# Patient Record
Sex: MALE | Race: WHITE
[De-identification: names, ages, dates, MRNs, and addresses within clinical notes are randomized per-mention and may not be internally consistent; named-entity substitution may affect disease eponyms.]

---

## 2018-08-05 ENCOUNTER — HOSPITAL ENCOUNTER (INPATIENT)
Dept: HOSPITAL 54 - ER | Age: 53
LOS: 1 days | Discharge: HOME | DRG: 243 | End: 2018-08-06
Attending: FAMILY MEDICINE | Admitting: NURSE PRACTITIONER
Payer: MEDICAID

## 2018-08-05 VITALS — SYSTOLIC BLOOD PRESSURE: 129 MMHG | DIASTOLIC BLOOD PRESSURE: 67 MMHG

## 2018-08-05 VITALS — DIASTOLIC BLOOD PRESSURE: 74 MMHG | SYSTOLIC BLOOD PRESSURE: 128 MMHG

## 2018-08-05 VITALS — SYSTOLIC BLOOD PRESSURE: 137 MMHG | DIASTOLIC BLOOD PRESSURE: 68 MMHG

## 2018-08-05 VITALS — DIASTOLIC BLOOD PRESSURE: 74 MMHG | SYSTOLIC BLOOD PRESSURE: 123 MMHG

## 2018-08-05 VITALS — BODY MASS INDEX: 32.28 KG/M2 | HEIGHT: 68 IN | WEIGHT: 213 LBS

## 2018-08-05 VITALS — SYSTOLIC BLOOD PRESSURE: 123 MMHG | DIASTOLIC BLOOD PRESSURE: 69 MMHG

## 2018-08-05 VITALS — DIASTOLIC BLOOD PRESSURE: 68 MMHG | SYSTOLIC BLOOD PRESSURE: 137 MMHG

## 2018-08-05 VITALS — SYSTOLIC BLOOD PRESSURE: 126 MMHG | DIASTOLIC BLOOD PRESSURE: 72 MMHG

## 2018-08-05 DIAGNOSIS — I10: ICD-10-CM

## 2018-08-05 DIAGNOSIS — E66.9: ICD-10-CM

## 2018-08-05 DIAGNOSIS — Z95.5: ICD-10-CM

## 2018-08-05 DIAGNOSIS — Z79.4: ICD-10-CM

## 2018-08-05 DIAGNOSIS — K21.9: Primary | ICD-10-CM

## 2018-08-05 DIAGNOSIS — E11.9: ICD-10-CM

## 2018-08-05 DIAGNOSIS — E83.42: ICD-10-CM

## 2018-08-05 DIAGNOSIS — Z83.3: ICD-10-CM

## 2018-08-05 DIAGNOSIS — I25.10: ICD-10-CM

## 2018-08-05 DIAGNOSIS — N17.0: ICD-10-CM

## 2018-08-05 DIAGNOSIS — Z79.899: ICD-10-CM

## 2018-08-05 DIAGNOSIS — Z79.82: ICD-10-CM

## 2018-08-05 LAB
APTT PPP: 27 SEC (ref 23–34)
BASOPHILS # BLD AUTO: 0 /CMM (ref 0–0.2)
BASOPHILS # BLD AUTO: 0 /CMM (ref 0–0.2)
BASOPHILS NFR BLD AUTO: 0.3 % (ref 0–2)
BASOPHILS NFR BLD AUTO: 0.4 % (ref 0–2)
BUN SERPL-MCNC: 17 MG/DL (ref 7–18)
BUN SERPL-MCNC: 17 MG/DL (ref 7–18)
CALCIUM SERPL-MCNC: 8.6 MG/DL (ref 8.5–10.1)
CALCIUM SERPL-MCNC: 8.8 MG/DL (ref 8.5–10.1)
CHLORIDE SERPL-SCNC: 101 MMOL/L (ref 98–107)
CHLORIDE SERPL-SCNC: 102 MMOL/L (ref 98–107)
CHOLEST SERPL-MCNC: 201 MG/DL (ref ?–200)
CO2 SERPL-SCNC: 28 MMOL/L (ref 21–32)
CO2 SERPL-SCNC: 28 MMOL/L (ref 21–32)
CREAT SERPL-MCNC: 1.1 MG/DL (ref 0.6–1.3)
CREAT SERPL-MCNC: 1.5 MG/DL (ref 0.6–1.3)
EOSINOPHIL NFR BLD AUTO: 0 % (ref 0–6)
EOSINOPHIL NFR BLD AUTO: 0.7 % (ref 0–6)
GLUCOSE SERPL-MCNC: 183 MG/DL (ref 74–106)
GLUCOSE SERPL-MCNC: 230 MG/DL (ref 74–106)
HCT VFR BLD AUTO: 39 % (ref 39–51)
HCT VFR BLD AUTO: 42 % (ref 39–51)
HDLC SERPL-MCNC: 50 MG/DL (ref 40–60)
HGB BLD-MCNC: 13.6 G/DL (ref 13.5–17.5)
HGB BLD-MCNC: 14.3 G/DL (ref 13.5–17.5)
INR PPP: 1 (ref 0.87–1.13)
LDLC SERPL DIRECT ASSAY-MCNC: 129 MG/DL (ref 0–99)
LYMPHOCYTES NFR BLD AUTO: 1.6 /CMM (ref 0.8–4.8)
LYMPHOCYTES NFR BLD AUTO: 1.6 /CMM (ref 0.8–4.8)
LYMPHOCYTES NFR BLD AUTO: 24 % (ref 20–44)
LYMPHOCYTES NFR BLD AUTO: 25.6 % (ref 20–44)
MAGNESIUM SERPL-MCNC: 1.7 MG/DL (ref 1.8–2.4)
MCH RBC QN AUTO: 31 PG (ref 26–33)
MCH RBC QN AUTO: 31 PG (ref 26–33)
MCHC RBC AUTO-ENTMCNC: 34 G/DL (ref 31–36)
MCHC RBC AUTO-ENTMCNC: 35 G/DL (ref 31–36)
MCV RBC AUTO: 89 FL (ref 80–96)
MCV RBC AUTO: 90 FL (ref 80–96)
MONOCYTES NFR BLD AUTO: 0.8 /CMM (ref 0.1–1.3)
MONOCYTES NFR BLD AUTO: 0.8 /CMM (ref 0.1–1.3)
MONOCYTES NFR BLD AUTO: 12.4 % (ref 2–12)
MONOCYTES NFR BLD AUTO: 13.1 % (ref 2–12)
NEUTROPHILS # BLD AUTO: 3.9 /CMM (ref 1.8–8.9)
NEUTROPHILS # BLD AUTO: 4.1 /CMM (ref 1.8–8.9)
NEUTROPHILS NFR BLD AUTO: 60.9 % (ref 43–81)
NEUTROPHILS NFR BLD AUTO: 62.6 % (ref 43–81)
NT-PROBNP SERPL-MCNC: 18 PG/ML (ref 0–125)
PHOSPHATE SERPL-MCNC: 3.2 MG/DL (ref 2.5–4.9)
PLATELET # BLD AUTO: 217 /CMM (ref 150–450)
PLATELET # BLD AUTO: 232 /CMM (ref 150–450)
POTASSIUM SERPL-SCNC: 4.3 MMOL/L (ref 3.5–5.1)
POTASSIUM SERPL-SCNC: 4.4 MMOL/L (ref 3.5–5.1)
RBC # BLD AUTO: 4.39 MIL/UL (ref 4.5–6)
RBC # BLD AUTO: 4.66 MIL/UL (ref 4.5–6)
RDW COEFFICIENT OF VARIATION: 12.7 (ref 11.5–15)
RDW COEFFICIENT OF VARIATION: 12.9 (ref 11.5–15)
SODIUM SERPL-SCNC: 138 MMOL/L (ref 136–145)
SODIUM SERPL-SCNC: 140 MMOL/L (ref 136–145)
TRIGL SERPL-MCNC: 103 MG/DL (ref 30–150)
TROPONIN I SERPL-MCNC: < 0.017 NG/ML (ref 0–0.06)
TSH SERPL DL<=0.005 MIU/L-ACNC: 1.88 UIU/ML (ref 0.36–3.74)
WBC NRBC COR # BLD AUTO: 6.4 K/UL (ref 4.3–11)
WBC NRBC COR # BLD AUTO: 6.6 K/UL (ref 4.3–11)

## 2018-08-05 PROCEDURE — Z7610: HCPCS

## 2018-08-05 PROCEDURE — A4606 OXYGEN PROBE USED W OXIMETER: HCPCS

## 2018-08-05 RX ADMIN — INSULIN HUMAN PRN UNIT: 100 INJECTION, SOLUTION PARENTERAL at 07:52

## 2018-08-05 RX ADMIN — Medication SCH EACH: at 11:48

## 2018-08-05 RX ADMIN — Medication SCH MG: at 09:20

## 2018-08-05 RX ADMIN — CLOPIDOGREL BISULFATE SCH MG: 75 TABLET, FILM COATED ORAL at 09:20

## 2018-08-05 RX ADMIN — Medication SCH EACH: at 07:49

## 2018-08-05 RX ADMIN — Medication SCH EACH: at 18:27

## 2018-08-05 RX ADMIN — Medication SCH EACH: at 21:23

## 2018-08-05 RX ADMIN — INSULIN HUMAN PRN UNIT: 100 INJECTION, SOLUTION PARENTERAL at 12:53

## 2018-08-05 RX ADMIN — ATORVASTATIN CALCIUM SCH MG: 40 TABLET, FILM COATED ORAL at 09:50

## 2018-08-05 RX ADMIN — METOPROLOL TARTRATE SCH MG: 25 TABLET, FILM COATED ORAL at 11:48

## 2018-08-05 RX ADMIN — Medication SCH EACH: at 17:31

## 2018-08-05 RX ADMIN — SODIUM CHLORIDE PRN MLS/HR: 9 INJECTION, SOLUTION INTRAVENOUS at 05:06

## 2018-08-05 RX ADMIN — MAGNESIUM SULFATE IN DEXTROSE SCH MLS/HR: 10 INJECTION, SOLUTION INTRAVENOUS at 11:11

## 2018-08-05 RX ADMIN — METOPROLOL TARTRATE SCH MG: 25 TABLET, FILM COATED ORAL at 17:31

## 2018-08-05 RX ADMIN — MAGNESIUM SULFATE IN DEXTROSE SCH MLS/HR: 10 INJECTION, SOLUTION INTRAVENOUS at 09:50

## 2018-08-05 RX ADMIN — INSULIN HUMAN PRN UNIT: 100 INJECTION, SOLUTION PARENTERAL at 18:26

## 2018-08-05 NOTE — NUR
TELE RN NOTES



RECEIVED PT FROM ER NURSE CORY. PT ON ROOM AIR, SATURATING WELL, NO RESPIRATORY DISTRESS 
NOTED. VITAL SIGNS STABLE. ON TELE MONITOR SR 78. SKIN ASSESSMENT DONE. AWAITING ADMITTING 
ORDERS. WILL CONTINUE TO MONITOR PT CLOSELY.

## 2018-08-05 NOTE — NUR
PT AMBULATORY TO ER BED 11. BIBFAMILY FROM HOME C/O CP X 3 DAYS;BURNING PAIN 
7/10. PT PLACED IN GOWN AND ON CARDIAC MONITOR. VSS/RESP EVEN UNLABORED/NAD 
NOTED/SKIN WARM AND DRY/AFEBRILE/DENIES N-V-D/AOX4. AWAITNG MD RINALDI.

## 2018-08-05 NOTE — NUR
TELE RN NOTES



NO ACUTE CHANGES NOTED DURING THE SHIFT. PROVIDED COMFORT AND SAFETY. BED ALARM ON. HEAD OF 
BED ELEVATED. SIDE RAILS UP. CALL LIGHT WITHIN REACH. ENDORSED TO THE AM NURSE FOR 
CONTINUITY FOR CARE.

## 2018-08-05 NOTE — NUR
NO C/O CHEST PAIN THROUGHOUT SHIFT. BLOOD SUGARS MONITORED, SPOKE WITH DR MELLO RE - 
SLIDING SCALE ORDER AND STATED TO MAKE IT TO MODERATE SCALE. PT EATS WELL, VOIDS WELL AND 
AMBULATES STEADILY. FAMILY AT BEDSIDE. BED LOW AND LOCKED. CALL LIGHT WITHN REACHED. WILL 
CONT TO MONITOR.

## 2018-08-05 NOTE — NUR
20G IV TO R HAND X 1 ATTEMPT USING ASEPTIC TECH. IV FLUSHES EASILY WITH NS, NO 
S/S INFILTRATION NOTED AT THIS TIME.

## 2018-08-05 NOTE — NUR
-------------------------------------------------------------------------------

            *** Note juan luis in ED - 08/05/18 at 0239 by SOBEIDA ***             

-------------------------------------------------------------------------------

20G IV TO L AC X 1 ATTEMPT USING ASEPTIC TECH. IV FLUSHES EASILY WITH NS, NO 
S/S INFILTRATION NOTED AT THIS TIME.

## 2018-08-06 VITALS — DIASTOLIC BLOOD PRESSURE: 56 MMHG | SYSTOLIC BLOOD PRESSURE: 109 MMHG

## 2018-08-06 VITALS — DIASTOLIC BLOOD PRESSURE: 70 MMHG | SYSTOLIC BLOOD PRESSURE: 127 MMHG

## 2018-08-06 VITALS — DIASTOLIC BLOOD PRESSURE: 77 MMHG | SYSTOLIC BLOOD PRESSURE: 123 MMHG

## 2018-08-06 VITALS — SYSTOLIC BLOOD PRESSURE: 123 MMHG | DIASTOLIC BLOOD PRESSURE: 77 MMHG

## 2018-08-06 VITALS — DIASTOLIC BLOOD PRESSURE: 65 MMHG | SYSTOLIC BLOOD PRESSURE: 149 MMHG

## 2018-08-06 LAB
ALBUMIN SERPL BCP-MCNC: 3.5 G/DL (ref 3.4–5)
ALP SERPL-CCNC: 64 U/L (ref 46–116)
ALT SERPL W P-5'-P-CCNC: 36 U/L (ref 12–78)
AST SERPL W P-5'-P-CCNC: 17 U/L (ref 15–37)
BASOPHILS # BLD AUTO: 0 /CMM (ref 0–0.2)
BASOPHILS NFR BLD AUTO: 0.2 % (ref 0–2)
BILIRUB SERPL-MCNC: 0.6 MG/DL (ref 0.2–1)
BUN SERPL-MCNC: 13 MG/DL (ref 7–18)
CALCIUM SERPL-MCNC: 8.2 MG/DL (ref 8.5–10.1)
CHLORIDE SERPL-SCNC: 103 MMOL/L (ref 98–107)
CO2 SERPL-SCNC: 28 MMOL/L (ref 21–32)
CREAT SERPL-MCNC: 1 MG/DL (ref 0.6–1.3)
EOSINOPHIL NFR BLD AUTO: 1.1 % (ref 0–6)
GLUCOSE SERPL-MCNC: 190 MG/DL (ref 74–106)
HCT VFR BLD AUTO: 41 % (ref 39–51)
HGB BLD-MCNC: 13.9 G/DL (ref 13.5–17.5)
LYMPHOCYTES NFR BLD AUTO: 1.4 /CMM (ref 0.8–4.8)
LYMPHOCYTES NFR BLD AUTO: 23.2 % (ref 20–44)
MAGNESIUM SERPL-MCNC: 2 MG/DL (ref 1.8–2.4)
MCH RBC QN AUTO: 31 PG (ref 26–33)
MCHC RBC AUTO-ENTMCNC: 34 G/DL (ref 31–36)
MCV RBC AUTO: 90 FL (ref 80–96)
MONOCYTES NFR BLD AUTO: 0.6 /CMM (ref 0.1–1.3)
MONOCYTES NFR BLD AUTO: 10.5 % (ref 2–12)
NEUTROPHILS # BLD AUTO: 4 /CMM (ref 1.8–8.9)
NEUTROPHILS NFR BLD AUTO: 65 % (ref 43–81)
PHOSPHATE SERPL-MCNC: 2.9 MG/DL (ref 2.5–4.9)
PLATELET # BLD AUTO: 202 /CMM (ref 150–450)
POTASSIUM SERPL-SCNC: 4.5 MMOL/L (ref 3.5–5.1)
PROT SERPL-MCNC: 6.7 G/DL (ref 6.4–8.2)
RBC # BLD AUTO: 4.49 MIL/UL (ref 4.5–6)
RDW COEFFICIENT OF VARIATION: 12.4 (ref 11.5–15)
SODIUM SERPL-SCNC: 139 MMOL/L (ref 136–145)
TROPONIN I SERPL-MCNC: < 0.017 NG/ML (ref 0–0.06)
WBC NRBC COR # BLD AUTO: 6.1 K/UL (ref 4.3–11)

## 2018-08-06 RX ADMIN — SODIUM CHLORIDE PRN MLS/HR: 9 INJECTION, SOLUTION INTRAVENOUS at 00:52

## 2018-08-06 RX ADMIN — METOPROLOL TARTRATE SCH MG: 25 TABLET, FILM COATED ORAL at 12:30

## 2018-08-06 RX ADMIN — Medication SCH EACH: at 07:39

## 2018-08-06 RX ADMIN — METOPROLOL TARTRATE SCH MG: 25 TABLET, FILM COATED ORAL at 00:00

## 2018-08-06 RX ADMIN — ATORVASTATIN CALCIUM SCH MG: 40 TABLET, FILM COATED ORAL at 09:15

## 2018-08-06 RX ADMIN — Medication SCH EACH: at 12:30

## 2018-08-06 RX ADMIN — METOPROLOL TARTRATE SCH MG: 25 TABLET, FILM COATED ORAL at 05:20

## 2018-08-06 RX ADMIN — INSULIN HUMAN PRN UNIT: 100 INJECTION, SOLUTION PARENTERAL at 12:29

## 2018-08-06 RX ADMIN — Medication SCH MG: at 09:15

## 2018-08-06 RX ADMIN — INSULIN HUMAN PRN UNIT: 100 INJECTION, SOLUTION PARENTERAL at 07:59

## 2018-08-06 RX ADMIN — CLOPIDOGREL BISULFATE SCH MG: 75 TABLET, FILM COATED ORAL at 09:15

## 2018-08-06 NOTE — NUR
DISCHARGE INSTRUCTIONS GIVEN REGARDING MEDICATIONS, ACTIVITIES, DIET AND FOLLOW UP 
APPOINTMENTS. HL REMOVED. PT HAS NO C/O CHEST PAIN. PT EATING WELL. VOIDING WELL. VITAL 
SIGNS MONITORED AND STABLE. WIFE CAME TO  PT. ENCOURAGED FOR QUESTIONS REGARDING 
DISCHARGE INSTRUCTIONS, NONE RAISED.

## 2019-04-15 ENCOUNTER — HOSPITAL ENCOUNTER (INPATIENT)
Dept: HOSPITAL 54 - ER | Age: 54
LOS: 2 days | Discharge: HOME | DRG: 243 | End: 2019-04-17
Attending: INTERNAL MEDICINE | Admitting: INTERNAL MEDICINE
Payer: COMMERCIAL

## 2019-04-15 VITALS — WEIGHT: 214 LBS | BODY MASS INDEX: 34.39 KG/M2 | HEIGHT: 66 IN

## 2019-04-15 DIAGNOSIS — Z95.5: ICD-10-CM

## 2019-04-15 DIAGNOSIS — I25.10: ICD-10-CM

## 2019-04-15 DIAGNOSIS — R79.89: ICD-10-CM

## 2019-04-15 DIAGNOSIS — E66.01: ICD-10-CM

## 2019-04-15 DIAGNOSIS — E11.9: ICD-10-CM

## 2019-04-15 DIAGNOSIS — E83.51: ICD-10-CM

## 2019-04-15 DIAGNOSIS — I25.2: ICD-10-CM

## 2019-04-15 DIAGNOSIS — E78.5: ICD-10-CM

## 2019-04-15 DIAGNOSIS — K21.9: Primary | ICD-10-CM

## 2019-04-15 DIAGNOSIS — E78.1: ICD-10-CM

## 2019-04-15 DIAGNOSIS — I10: ICD-10-CM

## 2019-04-15 LAB
BASOPHILS # BLD AUTO: 0 /CMM (ref 0–0.2)
BASOPHILS NFR BLD AUTO: 0.7 % (ref 0–2)
BUN SERPL-MCNC: 23 MG/DL (ref 7–18)
CALCIUM SERPL-MCNC: 8.6 MG/DL (ref 8.5–10.1)
CHLORIDE SERPL-SCNC: 103 MMOL/L (ref 98–107)
CO2 SERPL-SCNC: 29 MMOL/L (ref 21–32)
CREAT SERPL-MCNC: 1.3 MG/DL (ref 0.6–1.3)
EOSINOPHIL NFR BLD AUTO: 1.4 % (ref 0–6)
GLUCOSE SERPL-MCNC: 169 MG/DL (ref 74–106)
HCT VFR BLD AUTO: 43 % (ref 39–51)
HGB BLD-MCNC: 15.3 G/DL (ref 13.5–17.5)
LYMPHOCYTES NFR BLD AUTO: 1.9 /CMM (ref 0.8–4.8)
LYMPHOCYTES NFR BLD AUTO: 25.3 % (ref 20–44)
MCHC RBC AUTO-ENTMCNC: 35 G/DL (ref 31–36)
MCV RBC AUTO: 88 FL (ref 80–96)
MONOCYTES NFR BLD AUTO: 0.8 /CMM (ref 0.1–1.3)
MONOCYTES NFR BLD AUTO: 11.3 % (ref 2–12)
NEUTROPHILS # BLD AUTO: 4.5 /CMM (ref 1.8–8.9)
NEUTROPHILS NFR BLD AUTO: 61.3 % (ref 43–81)
PLATELET # BLD AUTO: 237 /CMM (ref 150–450)
POTASSIUM SERPL-SCNC: 4 MMOL/L (ref 3.5–5.1)
RBC # BLD AUTO: 4.89 MIL/UL (ref 4.5–6)
SODIUM SERPL-SCNC: 140 MMOL/L (ref 136–145)
WBC NRBC COR # BLD AUTO: 7.4 K/UL (ref 4.3–11)

## 2019-04-15 PROCEDURE — G0378 HOSPITAL OBSERVATION PER HR: HCPCS

## 2019-04-15 RX ADMIN — ATORVASTATIN CALCIUM SCH MG: 40 TABLET, FILM COATED ORAL at 23:44

## 2019-04-15 NOTE — NUR
PT BIBSELF COMPLAINING 5/10 CHEST PAIN RADIATING TO HEAD. PT STATES "CHEST PAIN 
STARTED 9PM YESTERDAY AND HAS NOT GONE AWAY." PT AXO4. RESPIRATIONS EVEN AND 
UNLABORED. PT PUT ON THE CARDIAC MONITOR AND PULSE OX. PENDING EVAL FROM ER MD.

## 2019-04-16 VITALS — SYSTOLIC BLOOD PRESSURE: 116 MMHG | DIASTOLIC BLOOD PRESSURE: 66 MMHG

## 2019-04-16 VITALS — SYSTOLIC BLOOD PRESSURE: 132 MMHG | DIASTOLIC BLOOD PRESSURE: 74 MMHG

## 2019-04-16 VITALS — SYSTOLIC BLOOD PRESSURE: 157 MMHG | DIASTOLIC BLOOD PRESSURE: 84 MMHG

## 2019-04-16 VITALS — SYSTOLIC BLOOD PRESSURE: 132 MMHG | DIASTOLIC BLOOD PRESSURE: 76 MMHG

## 2019-04-16 VITALS — DIASTOLIC BLOOD PRESSURE: 76 MMHG | SYSTOLIC BLOOD PRESSURE: 132 MMHG

## 2019-04-16 LAB
ALBUMIN SERPL BCP-MCNC: 3.8 G/DL (ref 3.4–5)
ALP SERPL-CCNC: 77 U/L (ref 46–116)
ALT SERPL W P-5'-P-CCNC: 37 U/L (ref 12–78)
AST SERPL W P-5'-P-CCNC: 17 U/L (ref 15–37)
BASOPHILS # BLD AUTO: 0 /CMM (ref 0–0.2)
BASOPHILS NFR BLD AUTO: 0.6 % (ref 0–2)
BILIRUB DIRECT SERPL-MCNC: 0.1 MG/DL (ref 0–0.2)
BILIRUB SERPL-MCNC: 0.4 MG/DL (ref 0.2–1)
BUN SERPL-MCNC: 20 MG/DL (ref 7–18)
CALCIUM SERPL-MCNC: 8.3 MG/DL (ref 8.5–10.1)
CHLORIDE SERPL-SCNC: 104 MMOL/L (ref 98–107)
CHOLEST SERPL-MCNC: 301 MG/DL (ref ?–200)
CO2 SERPL-SCNC: 27 MMOL/L (ref 21–32)
CREAT SERPL-MCNC: 1 MG/DL (ref 0.6–1.3)
EOSINOPHIL NFR BLD AUTO: 2.1 % (ref 0–6)
GLUCOSE SERPL-MCNC: 179 MG/DL (ref 74–106)
HCT VFR BLD AUTO: 40 % (ref 39–51)
HDLC SERPL-MCNC: 51 MG/DL (ref 40–60)
HGB BLD-MCNC: 14 G/DL (ref 13.5–17.5)
LDLC SERPL DIRECT ASSAY-MCNC: 201 MG/DL (ref 0–99)
LYMPHOCYTES NFR BLD AUTO: 1.7 /CMM (ref 0.8–4.8)
LYMPHOCYTES NFR BLD AUTO: 29.5 % (ref 20–44)
MAGNESIUM SERPL-MCNC: 1.9 MG/DL (ref 1.8–2.4)
MCHC RBC AUTO-ENTMCNC: 35 G/DL (ref 31–36)
MCV RBC AUTO: 88 FL (ref 80–96)
MONOCYTES NFR BLD AUTO: 0.7 /CMM (ref 0.1–1.3)
MONOCYTES NFR BLD AUTO: 11.2 % (ref 2–12)
NEUTROPHILS # BLD AUTO: 3.3 /CMM (ref 1.8–8.9)
NEUTROPHILS NFR BLD AUTO: 56.6 % (ref 43–81)
NT-PROBNP SERPL-MCNC: 7 PG/ML (ref 0–125)
PHOSPHATE SERPL-MCNC: 3.5 MG/DL (ref 2.5–4.9)
PLATELET # BLD AUTO: 197 /CMM (ref 150–450)
POTASSIUM SERPL-SCNC: 3.9 MMOL/L (ref 3.5–5.1)
PROT SERPL-MCNC: 7.2 G/DL (ref 6.4–8.2)
RBC # BLD AUTO: 4.57 MIL/UL (ref 4.5–6)
SODIUM SERPL-SCNC: 139 MMOL/L (ref 136–145)
TRIGL SERPL-MCNC: 359 MG/DL (ref 30–150)
WBC NRBC COR # BLD AUTO: 5.9 K/UL (ref 4.3–11)

## 2019-04-16 RX ADMIN — Medication PRN MG: at 02:59

## 2019-04-16 RX ADMIN — Medication PRN MG: at 14:14

## 2019-04-16 RX ADMIN — CLOPIDOGREL BISULFATE SCH MG: 75 TABLET, FILM COATED ORAL at 08:33

## 2019-04-16 RX ADMIN — ATORVASTATIN CALCIUM SCH MG: 40 TABLET, FILM COATED ORAL at 22:00

## 2019-04-16 RX ADMIN — Medication SCH EACH: at 08:41

## 2019-04-16 RX ADMIN — Medication SCH EACH: at 21:25

## 2019-04-16 RX ADMIN — Medication SCH EACH: at 11:30

## 2019-04-16 RX ADMIN — INSULIN HUMAN PRN UNIT: 100 INJECTION, SOLUTION PARENTERAL at 13:01

## 2019-04-16 RX ADMIN — Medication SCH EACH: at 17:42

## 2019-04-16 RX ADMIN — INSULIN HUMAN PRN UNIT: 100 INJECTION, SOLUTION PARENTERAL at 08:35

## 2019-04-16 RX ADMIN — INSULIN HUMAN PRN UNIT: 100 INJECTION, SOLUTION PARENTERAL at 21:27

## 2019-04-16 RX ADMIN — Medication SCH MG: at 08:34

## 2019-04-16 RX ADMIN — INSULIN HUMAN PRN UNIT: 100 INJECTION, SOLUTION PARENTERAL at 17:42

## 2019-04-16 RX ADMIN — LISINOPRIL SCH MG: 5 TABLET ORAL at 08:34

## 2019-04-16 NOTE — NUR
RN TELE NOTES,



PATIENT SLEEPING AT THIS TIME, BREATHING EVEN AND UNLABORED NO SOB/ACUTE DISTRESS NOTED AT 
THIS TIME,  NO FACIAL GRIMACING NOTED, NO S/S OF PAIN OR DISCOMFORT NOTED, , IV ACCESS IN 
RIGHT AC 20G, PATENT AND INTACT,  ON TELE MONITOR AND SR NOTED WITH HR 70S, CALL LIGHT W/I 
REACH, BED LOCKED AND IN LOW POSITION, NO SIGNIFICANT CHANGE IN CONDITION DURING THE REST OF 
THE SHIFT, WILL ENDORSE CONTINUITY OF CARE TO ONCOMING NURSE.

## 2019-04-16 NOTE — NUR
MS RN NOTE 

 DR FRIAS NOTIFIED ABOUT CTA RESULT   ,NO NEW ORDER GIVEN  AT THIS TIME  WITH POSSIBLE TO 
D\C HOME  TOMORROW

## 2019-04-16 NOTE — NUR
RN TELE NOTES,



PATIENT SLEEPING AT THIS TIME, BREATHING EVEN AND UNLABORED NO SOB/ACUTE DISTRESS NOTED AT 
THIS TIME,  , NO S/S OF PAIN OR DISCOMFORT NOTED, , IV ACCESS IN RIGHT AC 20G, PATENT AND 
INTACT,  ON TELE MONITOR AND SR NOTED WITH HR 69, CALL LIGHT W/I REACH, BED LOCKED AND IN 
LOW POSITION,   , 2DECHO DONE AS ORDERED  NO SOB NOTED WILL MONITOR CLOSELY ,SEEN BY DR FRIAS

## 2019-04-16 NOTE — NUR
ms rn note 

 resting comfortably , all needs attended ,not in discomfort at this time ,will cont to 
monitor closely

## 2019-04-16 NOTE — NUR
ms rn note

 ekg done as ordered, result given to dr dean  , awaiting for troponin  level ,chest pain 
is slightly subsided 6\10, will monitor

## 2019-04-16 NOTE — NUR
MS RN NOTE 

 DR JAMESON AT BEDSIDE NOTIFIED THAT PATIENT HAD CT ANGIOGRAM JUST NOW AND C\O CHEST 
PAIN MORPHINE 2 MF IVP GIVEN AS ORDERED ALSO AWARE THAT EARLIER 2D ECHO DONE, EF 65%  
TROPONIN 0.017 , STATED WILL MONITOR CLOSELY  FOR CT  ANGIOGRAM RESULT, NO NEW ORDER GIVEN 
AR THIS TIME  ,

## 2019-04-16 NOTE — NUR
ms rn note 

 dr hung notifyed of cta result  no new order given at this time ok to stay patient 
over night

## 2019-04-16 NOTE — NUR
RN TELE NOTES,



RECEIVED 53 YEAR OLD MALE  FROM ER DEPARTMENT VIA STRETCHER IN COMPANY OF 2 RNS IN STABLE  
CONDITION, NO SOB/ACUTE DISTRESS NOTED AT THIS TIME, UNDER MEDICAL SERVICES OF MAX PATEL NP, ADMITTING DX OF CHEST PAIN, PATIENT A/O X4, ABLE TO VERBALIZED NEEDS AND 
CONCERNS, C/O CHEST PAIN 7/10, WILL F/U WITH [facility] FOR ORDERS, IV ACCESS IN RIGHT AC 20G, 
PATENT AND INTACT, AFEBRILE, UPON SKIN ASSESSMENT SKIN NOTED INTACT, AMBULATORY,  ATTACH TO 
TELE MONITOR AND SR NOTED WITH HR 70S, CALL LIGHT W/I REACH, BED LOCKED AND IN LOW POSITION, 
ORIENTED TO ROOM AND CALL LIGHT USE, WILL CONTINUE TO MONITOR CLOSELY. VS  132/76, 72, 98.1, 
96%, 7/10, 18.

## 2019-04-16 NOTE — NUR
ms rn note 

  back from ct angio gram ,c\o chest pain 8\1o , morphine 2 mg ivp given as ordered, nitro 
glycerin sl  was given prior to cone to unit by ct  angio rn , will f\u

## 2019-04-16 NOTE — NUR
RN TELE NOTES,



PATIENT IN BED AWAKE A/O X4 WATCHING TV AT THIS TIME, BREATHING EVEN AND UNLABORED NO 
SOB/ACUTE DISTRESS NOTED AT THIS TIME,  NO S/S OF PAIN OR DISCOMFORT NOTED, DENIES PAIN OR 
DISCOMFORT AT THIS TIME,  IV ACCESS IN RIGHT AC 20G, PATENT AND INTACT,   CALL LIGHT W/I 
REACH, BED LOCKED AND IN LOW POSITION,  WILL CONTINUE TO MONITOR PATIENT CLOSELY.

## 2019-04-17 VITALS — SYSTOLIC BLOOD PRESSURE: 109 MMHG | DIASTOLIC BLOOD PRESSURE: 73 MMHG

## 2019-04-17 VITALS — SYSTOLIC BLOOD PRESSURE: 121 MMHG | DIASTOLIC BLOOD PRESSURE: 68 MMHG

## 2019-04-17 LAB
BASOPHILS # BLD AUTO: 0 /CMM (ref 0–0.2)
BASOPHILS NFR BLD AUTO: 0.2 % (ref 0–2)
BUN SERPL-MCNC: 16 MG/DL (ref 7–18)
CALCIUM SERPL-MCNC: 8.7 MG/DL (ref 8.5–10.1)
CHLORIDE SERPL-SCNC: 101 MMOL/L (ref 98–107)
CO2 SERPL-SCNC: 27 MMOL/L (ref 21–32)
CREAT SERPL-MCNC: 0.9 MG/DL (ref 0.6–1.3)
EOSINOPHIL NFR BLD AUTO: 1.6 % (ref 0–6)
GLUCOSE SERPL-MCNC: 176 MG/DL (ref 74–106)
HCT VFR BLD AUTO: 43 % (ref 39–51)
HGB BLD-MCNC: 15.2 G/DL (ref 13.5–17.5)
LYMPHOCYTES NFR BLD AUTO: 1.2 /CMM (ref 0.8–4.8)
LYMPHOCYTES NFR BLD AUTO: 19 % (ref 20–44)
MAGNESIUM SERPL-MCNC: 2.1 MG/DL (ref 1.8–2.4)
MCHC RBC AUTO-ENTMCNC: 35 G/DL (ref 31–36)
MCV RBC AUTO: 88 FL (ref 80–96)
MONOCYTES NFR BLD AUTO: 0.7 /CMM (ref 0.1–1.3)
MONOCYTES NFR BLD AUTO: 10.8 % (ref 2–12)
NEUTROPHILS # BLD AUTO: 4.5 /CMM (ref 1.8–8.9)
NEUTROPHILS NFR BLD AUTO: 68.4 % (ref 43–81)
PHOSPHATE SERPL-MCNC: 3.3 MG/DL (ref 2.5–4.9)
PLATELET # BLD AUTO: 209 /CMM (ref 150–450)
POTASSIUM SERPL-SCNC: 4.1 MMOL/L (ref 3.5–5.1)
RBC # BLD AUTO: 4.94 MIL/UL (ref 4.5–6)
SODIUM SERPL-SCNC: 137 MMOL/L (ref 136–145)
WBC NRBC COR # BLD AUTO: 6.6 K/UL (ref 4.3–11)

## 2019-04-17 RX ADMIN — CLOPIDOGREL BISULFATE SCH MG: 75 TABLET, FILM COATED ORAL at 09:01

## 2019-04-17 RX ADMIN — Medication SCH MG: at 09:01

## 2019-04-17 RX ADMIN — INSULIN HUMAN PRN UNIT: 100 INJECTION, SOLUTION PARENTERAL at 09:09

## 2019-04-17 RX ADMIN — INSULIN HUMAN PRN UNIT: 100 INJECTION, SOLUTION PARENTERAL at 12:31

## 2019-04-17 RX ADMIN — Medication SCH EACH: at 09:00

## 2019-04-17 RX ADMIN — LISINOPRIL SCH MG: 5 TABLET ORAL at 09:01

## 2019-04-17 RX ADMIN — Medication SCH EACH: at 12:26

## 2019-04-17 NOTE — NUR
RN TELE NOTES,



PATIENT IN BED AWAKE A/O X4 WATCHING TV AT THIS TIME, BREATHING EVEN AND UNLABORED NO 
SOB/ACUTE DISTRESS NOTED AT THIS TIME,  NO S/S OF PAIN OR DISCOMFORT NOTED, DENIES CHEST 
PAIN OR DISCOMFORT AT THIS TIME,  IV ACCESS IN RIGHT AC 20G, PATENT AND INTACT,   CALL LIGHT 
W/I REACH, ALL NEEDS PROVIDED, NO CHANGE IN CONDITION THROUGHOUT THE NIGHT, BED LOCKED AND 
IN LOW POSITION,  WILL ENDORSE CONTINUITY OF CARE TO ONCOMING NURSE.

## 2019-04-17 NOTE — NUR
RN NOTE

PT DISCHARGE HOME, IN STABLE CONDITION WITH WIFE MAYTE, VIA OWN TRANSPORTATION, DISCHARGE 
INSTRUCTIONS PROVIDED TO PT, EXIT CARE, DONE, BELONGINGS LIST SIGNED AND BELONGINGS PROVIDED 
TO PT, PRESCRIPTION GIVEN TO PT. SKIN IS INTACT. COPIES LEFT IN CHART.

-------------------------------------------------------------------------------

Addendum: 04/17/19 at 2108 by DIPIKA SUAREZ RN

-------------------------------------------------------------------------------

PT DISCHARGED HOME AT 1400, IV REMOVED, ID BAND REMOVED.

## 2021-09-13 ENCOUNTER — HOSPITAL ENCOUNTER (EMERGENCY)
Dept: HOSPITAL 54 - ER | Age: 56
Discharge: HOME | End: 2021-09-13
Payer: COMMERCIAL

## 2021-09-13 VITALS — HEIGHT: 68 IN | WEIGHT: 200 LBS | BODY MASS INDEX: 30.31 KG/M2

## 2021-09-13 VITALS — DIASTOLIC BLOOD PRESSURE: 80 MMHG | SYSTOLIC BLOOD PRESSURE: 140 MMHG

## 2021-09-13 DIAGNOSIS — E10.9: ICD-10-CM

## 2021-09-13 DIAGNOSIS — R51.9: Primary | ICD-10-CM

## 2021-09-13 DIAGNOSIS — Z79.899: ICD-10-CM

## 2021-09-13 DIAGNOSIS — Z86.73: ICD-10-CM

## 2021-09-13 DIAGNOSIS — I25.2: ICD-10-CM

## 2021-09-13 DIAGNOSIS — Z98.890: ICD-10-CM

## 2021-09-13 DIAGNOSIS — E78.5: ICD-10-CM

## 2021-09-13 DIAGNOSIS — Z79.82: ICD-10-CM

## 2021-09-13 LAB
BASOPHILS # BLD AUTO: 0 K/UL (ref 0–0.2)
BASOPHILS NFR BLD AUTO: 0.5 % (ref 0–2)
BUN SERPL-MCNC: 16 MG/DL (ref 7–18)
CALCIUM SERPL-MCNC: 8.6 MG/DL (ref 8.5–10.1)
CHLORIDE SERPL-SCNC: 107 MMOL/L (ref 98–107)
CO2 SERPL-SCNC: 27 MMOL/L (ref 21–32)
CREAT SERPL-MCNC: 0.9 MG/DL (ref 0.6–1.3)
EOSINOPHIL NFR BLD AUTO: 1.4 % (ref 0–6)
GLUCOSE SERPL-MCNC: 259 MG/DL (ref 74–106)
HCT VFR BLD AUTO: 37 % (ref 39–51)
HGB BLD-MCNC: 13.3 G/DL (ref 13.5–17.5)
LYMPHOCYTES NFR BLD AUTO: 1.5 K/UL (ref 0.8–4.8)
LYMPHOCYTES NFR BLD AUTO: 27.9 % (ref 20–44)
MCHC RBC AUTO-ENTMCNC: 36 G/DL (ref 31–36)
MCV RBC AUTO: 88 FL (ref 80–96)
MONOCYTES NFR BLD AUTO: 0.6 K/UL (ref 0.1–1.3)
MONOCYTES NFR BLD AUTO: 10.3 % (ref 2–12)
NEUTROPHILS # BLD AUTO: 3.2 K/UL (ref 1.8–8.9)
NEUTROPHILS NFR BLD AUTO: 59.9 % (ref 43–81)
PLATELET # BLD AUTO: 202 K/UL (ref 150–450)
POTASSIUM SERPL-SCNC: 4.1 MMOL/L (ref 3.5–5.1)
RBC # BLD AUTO: 4.27 MIL/UL (ref 4.5–6)
SODIUM SERPL-SCNC: 141 MMOL/L (ref 136–145)
WBC NRBC COR # BLD AUTO: 5.4 K/UL (ref 4.3–11)

## 2021-09-13 NOTE — NUR
BIBS FOR C/O POSTERIOR HEADACHE AND OCCASIONAL DIZZINESS X 4-5 DAYS. HX OF CVA 
W/ R SIDED WEAKNESS. -N/V. PT ALERT AND ORIENTED X3. AMBULATORY WITH NON 
LABORED BREATHING.